# Patient Record
Sex: FEMALE | Race: WHITE | NOT HISPANIC OR LATINO | Employment: FULL TIME | URBAN - METROPOLITAN AREA
[De-identification: names, ages, dates, MRNs, and addresses within clinical notes are randomized per-mention and may not be internally consistent; named-entity substitution may affect disease eponyms.]

---

## 2023-12-05 ENCOUNTER — TELEPHONE (OUTPATIENT)
Dept: PRIMARY CARE | Facility: CLINIC | Age: 28
End: 2023-12-05

## 2023-12-18 DIAGNOSIS — E10.9 TYPE 1 DIABETES MELLITUS WITHOUT COMPLICATIONS (MULTI): ICD-10-CM

## 2023-12-24 RX ORDER — BLOOD-GLUCOSE SENSOR
EACH MISCELLANEOUS
Qty: 9 EACH | Refills: 3 | Status: SHIPPED | OUTPATIENT
Start: 2023-12-24

## 2024-07-26 DIAGNOSIS — E10.9 TYPE 1 DIABETES MELLITUS WITHOUT COMPLICATION (MULTI): Primary | ICD-10-CM

## 2024-07-26 RX ORDER — BLOOD-GLUCOSE TRANSMITTER
EACH MISCELLANEOUS
Qty: 1 EACH | Refills: 0 | Status: SHIPPED | OUTPATIENT
Start: 2024-07-26

## 2024-08-19 DIAGNOSIS — E10.9 TYPE 1 DIABETES MELLITUS WITHOUT COMPLICATION (MULTI): Primary | ICD-10-CM

## 2024-08-21 RX ORDER — INSULIN GLARGINE 100 [IU]/ML
13 INJECTION, SOLUTION SUBCUTANEOUS 2 TIMES DAILY
Qty: 30 ML | Refills: 0 | Status: SHIPPED | OUTPATIENT
Start: 2024-08-21 | End: 2024-11-19

## 2024-09-03 ENCOUNTER — OFFICE VISIT (OUTPATIENT)
Dept: ENDOCRINOLOGY | Facility: CLINIC | Age: 29
End: 2024-09-03
Payer: COMMERCIAL

## 2024-09-03 VITALS
HEART RATE: 86 BPM | DIASTOLIC BLOOD PRESSURE: 89 MMHG | BODY MASS INDEX: 31.83 KG/M2 | SYSTOLIC BLOOD PRESSURE: 126 MMHG | WEIGHT: 174 LBS

## 2024-09-03 DIAGNOSIS — E10.9 TYPE 1 DIABETES MELLITUS WITHOUT COMPLICATION (MULTI): Primary | ICD-10-CM

## 2024-09-03 LAB — POC HEMOGLOBIN A1C: 7.9 % (ref 4.2–6.5)

## 2024-09-03 PROCEDURE — 1036F TOBACCO NON-USER: CPT | Performed by: INTERNAL MEDICINE

## 2024-09-03 PROCEDURE — 3074F SYST BP LT 130 MM HG: CPT | Performed by: INTERNAL MEDICINE

## 2024-09-03 PROCEDURE — 3079F DIAST BP 80-89 MM HG: CPT | Performed by: INTERNAL MEDICINE

## 2024-09-03 PROCEDURE — 99214 OFFICE O/P EST MOD 30 MIN: CPT | Performed by: INTERNAL MEDICINE

## 2024-09-03 PROCEDURE — 83036 HEMOGLOBIN GLYCOSYLATED A1C: CPT | Performed by: INTERNAL MEDICINE

## 2024-09-03 RX ORDER — INSULIN GLARGINE 100 [IU]/ML
17 INJECTION, SOLUTION SUBCUTANEOUS 2 TIMES DAILY
Qty: 30 ML | Refills: 3 | Status: SHIPPED | OUTPATIENT
Start: 2024-09-03

## 2024-09-03 RX ORDER — INSULIN GLARGINE 100 [IU]/ML
17 INJECTION, SOLUTION SUBCUTANEOUS 2 TIMES DAILY
Qty: 30 ML | Refills: 3 | Status: SHIPPED | OUTPATIENT
Start: 2024-09-03 | End: 2024-09-03 | Stop reason: CLARIF

## 2024-09-03 RX ORDER — INSULIN ASPART INJECTION 100 [IU]/ML
5-10 INJECTION, SOLUTION SUBCUTANEOUS
Qty: 30 ML | Refills: 3 | Status: SHIPPED | OUTPATIENT
Start: 2024-09-03

## 2024-09-03 RX ORDER — BLOOD-GLUCOSE SENSOR
EACH MISCELLANEOUS
Qty: 9 EACH | Refills: 3 | Status: SHIPPED | OUTPATIENT
Start: 2024-09-03

## 2024-09-03 RX ORDER — INSULIN ASPART INJECTION 100 [IU]/ML
INJECTION, SOLUTION SUBCUTANEOUS
COMMUNITY
End: 2024-09-03 | Stop reason: SDUPTHER

## 2024-09-03 ASSESSMENT — ENCOUNTER SYMPTOMS
SORE THROAT: 0
ABDOMINAL PAIN: 0
CONSTIPATION: 0
NERVOUS/ANXIOUS: 0
SHORTNESS OF BREATH: 0
POLYDIPSIA: 0
APPETITE CHANGE: 0
DIARRHEA: 0
VOMITING: 0
HEADACHES: 0
COUGH: 0
NAUSEA: 0
FREQUENCY: 0
FEVER: 0
ARTHRALGIAS: 0

## 2024-09-03 NOTE — PROGRESS NOTES
History Of Present Illness  Dino Nicole is a 29 y.o. female     Duration of type 1 diabetes mellitus:  15 years  Complications:  none    Patient last seen 15 months ago    Fiasp 2 units per 15 grams carb before meals  Add 1 unit for every 50 over glucose 101 mg/dl.      Basaglar 16 units twice daily     OmniPod, stopped 2 years ago due to cost    DexCom G6  Patient is testing glucose 288 times daily  Upload failed    Last eye exam:  2017  Planned tomorrow    Past Medical History  She has a past medical history of Other conditions influencing health status.    Surgical History  She has a past surgical history that includes Other surgical history (05/25/2017).     Social History  She reports that she has never smoked. She has never used smokeless tobacco. No history on file for alcohol use and drug use.    Family History  No family history on file.    Medications  Current Outpatient Medications   Medication Instructions    Basaglar KwikPen U-100 Insulin 13 Units, subcutaneous, 2 times daily    blood-glucose sensor (Dexcom G6 Sensor) device USE AS DIRECTED FOR CONTINUOUS GLUCOSE MONITORING - CHANGE EVERY 10 DAYS    Dexcom G6 Transmitter device For continuous glucose monitoring.  Change every 90 days.    insulin aspart, with niacinamide, (Fiasp FlexTouch U-100 Insulin) 100 unit/mL (3 mL) injection subcutaneous, Take as directed per insulin instructions.       Allergies  Patient has no known allergies.    Review of Systems   Constitutional:  Negative for appetite change and fever.   HENT:  Negative for sore throat.         Denies dry mouth   Eyes:  Negative for visual disturbance.   Respiratory:  Negative for cough and shortness of breath.    Cardiovascular:  Negative for chest pain.   Gastrointestinal:  Negative for abdominal pain, constipation, diarrhea, nausea and vomiting.   Endocrine: Negative for polydipsia and polyuria.   Genitourinary:  Negative for frequency.   Musculoskeletal:  Negative for arthralgias.    Skin:  Negative for rash.   Neurological:  Negative for headaches.   Psychiatric/Behavioral:  The patient is not nervous/anxious.          Last Recorded Vitals  Blood pressure 126/89, pulse 86, weight 78.9 kg (174 lb).    Physical Exam  Constitutional:       General: She is not in acute distress.  HENT:      Head: Normocephalic.      Mouth/Throat:      Mouth: Mucous membranes are moist.   Eyes:      Extraocular Movements: Extraocular movements intact.   Neck:      Thyroid: No thyroid mass or thyromegaly.   Cardiovascular:      Pulses:           Radial pulses are 2+ on the right side and 2+ on the left side.   Musculoskeletal:      Right lower leg: No edema.      Left lower leg: No edema.   Lymphadenopathy:      Cervical: No cervical adenopathy.   Neurological:      Mental Status: She is alert.      Motor: No tremor.   Psychiatric:         Mood and Affect: Affect normal.          Relevant Results  Glucose (mg/dL)   Date Value   09/16/2023 169 (H)   11/17/2021 238 (H)   02/15/2021 133 (H)     POC HEMOGLOBIN A1c (%)   Date Value   09/03/2024 7.9 (A)     Hemoglobin A1C (%)   Date Value   11/17/2021 7.9 (A)   11/29/2019 7.4 (H)     Bicarbonate (mmol/L)   Date Value   09/16/2023 29   11/17/2021 28   02/15/2021 29     Urea Nitrogen (mg/dL)   Date Value   09/16/2023 8   11/17/2021 14   02/15/2021 6     Creatinine (mg/dL)   Date Value   09/16/2023 0.67   11/17/2021 0.79   02/15/2021 0.69         IMPRESSION  TYPE 1 DIABETES MELLITUS  Rapid A1c 7.9%  DexCom upload failed  Stated fasting hyperglycemia  Eye exam overdue    Patient is adhering to and benefitting from continuous glucose monitoring.       RECOMMENDATIONS  Fiasp (or covered alternative) 2 units per 15 grams carb before meals  Add 1 unit for every 50 over glucose 101 mg/dl.      Basaglar 17 units twice daily     Follow up 3 months  Draw labs, urine albumin before next appointment    Eye exam now and annually

## 2024-09-03 NOTE — PATIENT INSTRUCTIONS
A1c 7.9%    RECOMMENDATIONS  Fiasp (or covered alternative) 2 units per 15 grams carb before meals  Add 1 unit for every 50 over glucose 101 mg/dl.      Basaglar 17 units twice daily     Follow up 3 months  Draw labs, urine albumin before next appointment    Eye exam now and annually

## 2024-09-17 ENCOUNTER — APPOINTMENT (OUTPATIENT)
Dept: ENDOCRINOLOGY | Facility: CLINIC | Age: 29
End: 2024-09-17

## 2024-12-07 DIAGNOSIS — E10.9 TYPE 1 DIABETES MELLITUS WITHOUT COMPLICATION: ICD-10-CM

## 2024-12-09 RX ORDER — INSULIN GLARGINE 100 [IU]/ML
13 INJECTION, SOLUTION SUBCUTANEOUS 2 TIMES DAILY
Qty: 30 ML | Refills: 3 | Status: SHIPPED | OUTPATIENT
Start: 2024-12-09

## 2024-12-17 ENCOUNTER — APPOINTMENT (OUTPATIENT)
Dept: ENDOCRINOLOGY | Facility: CLINIC | Age: 29
End: 2024-12-17
Payer: COMMERCIAL

## 2024-12-17 VITALS
WEIGHT: 176 LBS | SYSTOLIC BLOOD PRESSURE: 116 MMHG | DIASTOLIC BLOOD PRESSURE: 82 MMHG | HEART RATE: 83 BPM | BODY MASS INDEX: 32.19 KG/M2

## 2024-12-17 DIAGNOSIS — E10.9 TYPE 1 DIABETES MELLITUS WITHOUT COMPLICATION: ICD-10-CM

## 2024-12-17 LAB — POC HEMOGLOBIN A1C: 7.4 % (ref 4.2–6.5)

## 2024-12-17 PROCEDURE — 3074F SYST BP LT 130 MM HG: CPT | Performed by: INTERNAL MEDICINE

## 2024-12-17 PROCEDURE — 99214 OFFICE O/P EST MOD 30 MIN: CPT | Performed by: INTERNAL MEDICINE

## 2024-12-17 PROCEDURE — 1036F TOBACCO NON-USER: CPT | Performed by: INTERNAL MEDICINE

## 2024-12-17 PROCEDURE — 83036 HEMOGLOBIN GLYCOSYLATED A1C: CPT | Performed by: INTERNAL MEDICINE

## 2024-12-17 PROCEDURE — 3079F DIAST BP 80-89 MM HG: CPT | Performed by: INTERNAL MEDICINE

## 2024-12-17 RX ORDER — INSULIN ASPART INJECTION 100 [IU]/ML
5-15 INJECTION, SOLUTION SUBCUTANEOUS
Qty: 45 ML | Refills: 3 | Status: SHIPPED | OUTPATIENT
Start: 2024-12-17

## 2024-12-17 ASSESSMENT — ENCOUNTER SYMPTOMS
SORE THROAT: 0
COUGH: 0
DIARRHEA: 0
HEADACHES: 0
CONSTIPATION: 0
POLYDIPSIA: 0
ARTHRALGIAS: 0
APPETITE CHANGE: 0
NAUSEA: 0
FREQUENCY: 0
VOMITING: 0
ABDOMINAL PAIN: 0
NERVOUS/ANXIOUS: 0
FEVER: 0
SHORTNESS OF BREATH: 0

## 2024-12-17 NOTE — LETTER
December 17, 2024     Junaid Pride DO  [address] [place] [id number]    Patient: Dino Nicole   YOB: 1995   Date of Visit: 12/17/2024       Dear Dr. Junaid Pride DO:    Thank you for referring Dino Nicole to me for evaluation. Below are my notes for this consultation.  If you have questions, please do not hesitate to call me. I look forward to following your patient along with you.       Sincerely,     Orlando Ba MD      CC: No Recipients  ______________________________________________________________________________________    History Of Present Illness  Dino Nicole is a 29 y.o. female     Duration of type 1 diabetes mellitus:  15 years  Complications:  none     Fiasp 2 units per 15 grams carb before meals  Add 1 unit for every 50 over glucose 101 mg/dl.      Basaglar 17 units twice daily      OmniPod, stopped 2 years ago due to cost     DexCom G7  Patient is testing glucose 1440 times daily  Records reviewed, on file     Last eye exam:  2017    Past Medical History  She has a past medical history of Other conditions influencing health status.    Surgical History  She has a past surgical history that includes Other surgical history (05/25/2017).     Social History  She reports that she has never smoked. She has never used smokeless tobacco. No history on file for alcohol use and drug use.    Family History  No family history on file.    Medications  Current Outpatient Medications   Medication Instructions   • Basaglar KwikPen U-100 Insulin 13 Units, subcutaneous, 2 times daily   • Dexcom G7 Sensor device For continuous glucose monitoring.  Change every 10 days.   • Fiasp FlexTouch U-100 Insulin 100 unit/mL (3 mL) injection 5-10 Units, subcutaneous, 3 times daily before meals, Take as directed per insulin instructions.       Allergies  Patient has no known allergies.    Review of Systems   Constitutional:  Negative for appetite change and fever.   HENT:  Negative for  sore throat.         Denies dry mouth   Eyes:  Negative for visual disturbance.   Respiratory:  Negative for cough and shortness of breath.    Cardiovascular:  Negative for chest pain.   Gastrointestinal:  Negative for abdominal pain, constipation, diarrhea, nausea and vomiting.   Endocrine: Negative for polydipsia and polyuria.   Genitourinary:  Negative for frequency.   Musculoskeletal:  Negative for arthralgias.   Skin:  Negative for rash.   Neurological:  Negative for headaches.   Psychiatric/Behavioral:  The patient is not nervous/anxious.          Last Recorded Vitals  Blood pressure 116/82, pulse 83, weight 79.8 kg (176 lb).    Physical Exam  Constitutional:       General: She is not in acute distress.  HENT:      Head: Normocephalic.      Mouth/Throat:      Mouth: Mucous membranes are moist.   Eyes:      Extraocular Movements: Extraocular movements intact.   Neck:      Thyroid: No thyroid mass or thyromegaly.   Cardiovascular:      Pulses:           Radial pulses are 2+ on the right side and 2+ on the left side.   Lymphadenopathy:      Cervical: No cervical adenopathy.   Neurological:      Mental Status: She is alert.      Motor: No tremor.   Psychiatric:         Mood and Affect: Affect normal.          Relevant Results  Glucose (mg/dL)   Date Value   09/16/2023 169 (H)   11/17/2021 238 (H)   02/15/2021 133 (H)     POC HEMOGLOBIN A1c (%)   Date Value   09/03/2024 7.9 (A)     Hemoglobin A1C (%)   Date Value   11/17/2021 7.9 (A)   11/29/2019 7.4 (H)     Bicarbonate (mmol/L)   Date Value   09/16/2023 29   11/17/2021 28   02/15/2021 29     Urea Nitrogen (mg/dL)   Date Value   09/16/2023 8   11/17/2021 14   02/15/2021 6     Creatinine (mg/dL)   Date Value   09/16/2023 0.67   11/17/2021 0.79   02/15/2021 0.69       IMPRESSION  TYPE 1 DIABETES MELLITUS  Rapid A1c 7.4%  A1c improved  Postprandial hyperglycemia at 1500 and 2300  Patient is adhering to and benefitting from continuous glucose monitoring.        RECOMMENDATIONS  Fiasp 1 unit per 5 grams carb before meals  Add 1 unit for every 50 over glucose 101 mg/dl.     Continue Basaglar 17 units twice daily    Follow up 3-4 months

## 2024-12-17 NOTE — PROGRESS NOTES
History Of Present Illness  Dino Nicole is a 29 y.o. female     Duration of type 1 diabetes mellitus:  15 years  Complications:  none     Fiasp 2 units per 15 grams carb before meals  Add 1 unit for every 50 over glucose 101 mg/dl.      Basaglar 17 units twice daily      OmniPod, stopped 2 years ago due to cost     DexCom G7  Patient is testing glucose 1440 times daily  Records reviewed, on file     Last eye exam:  2017    Past Medical History  She has a past medical history of Other conditions influencing health status.    Surgical History  She has a past surgical history that includes Other surgical history (05/25/2017).     Social History  She reports that she has never smoked. She has never used smokeless tobacco. No history on file for alcohol use and drug use.    Family History  No family history on file.    Medications  Current Outpatient Medications   Medication Instructions    Basaglar KwikPen U-100 Insulin 13 Units, subcutaneous, 2 times daily    Dexcom G7 Sensor device For continuous glucose monitoring.  Change every 10 days.    Fiasp FlexTouch U-100 Insulin 100 unit/mL (3 mL) injection 5-10 Units, subcutaneous, 3 times daily before meals, Take as directed per insulin instructions.       Allergies  Patient has no known allergies.    Review of Systems   Constitutional:  Negative for appetite change and fever.   HENT:  Negative for sore throat.         Denies dry mouth   Eyes:  Negative for visual disturbance.   Respiratory:  Negative for cough and shortness of breath.    Cardiovascular:  Negative for chest pain.   Gastrointestinal:  Negative for abdominal pain, constipation, diarrhea, nausea and vomiting.   Endocrine: Negative for polydipsia and polyuria.   Genitourinary:  Negative for frequency.   Musculoskeletal:  Negative for arthralgias.   Skin:  Negative for rash.   Neurological:  Negative for headaches.   Psychiatric/Behavioral:  The patient is not nervous/anxious.          Last Recorded  Vitals  Blood pressure 116/82, pulse 83, weight 79.8 kg (176 lb).    Physical Exam  Constitutional:       General: She is not in acute distress.  HENT:      Head: Normocephalic.      Mouth/Throat:      Mouth: Mucous membranes are moist.   Eyes:      Extraocular Movements: Extraocular movements intact.   Neck:      Thyroid: No thyroid mass or thyromegaly.   Cardiovascular:      Pulses:           Radial pulses are 2+ on the right side and 2+ on the left side.   Lymphadenopathy:      Cervical: No cervical adenopathy.   Neurological:      Mental Status: She is alert.      Motor: No tremor.   Psychiatric:         Mood and Affect: Affect normal.          Relevant Results  Glucose (mg/dL)   Date Value   09/16/2023 169 (H)   11/17/2021 238 (H)   02/15/2021 133 (H)     POC HEMOGLOBIN A1c (%)   Date Value   09/03/2024 7.9 (A)     Hemoglobin A1C (%)   Date Value   11/17/2021 7.9 (A)   11/29/2019 7.4 (H)     Bicarbonate (mmol/L)   Date Value   09/16/2023 29   11/17/2021 28   02/15/2021 29     Urea Nitrogen (mg/dL)   Date Value   09/16/2023 8   11/17/2021 14   02/15/2021 6     Creatinine (mg/dL)   Date Value   09/16/2023 0.67   11/17/2021 0.79   02/15/2021 0.69       IMPRESSION  TYPE 1 DIABETES MELLITUS  Rapid A1c 7.4%  A1c improved  Postprandial hyperglycemia at 1500 and 2300  Patient is adhering to and benefitting from continuous glucose monitoring.       RECOMMENDATIONS  Fiasp 1 unit per 5 grams carb before meals  Add 1 unit for every 50 over glucose 101 mg/dl.     Continue Basaglar 17 units twice daily    Follow up 3-4 months

## 2024-12-17 NOTE — PATIENT INSTRUCTIONS
A1c 7.4%    RECOMMENDATIONS  Fiasp 1 unit per 5 grams carb before meals  Add 1 unit for every 50 over glucose 101 mg/dl.     Continue Basaglar 17 units twice daily    Follow up 3-4 months

## 2024-12-18 DIAGNOSIS — E10.9 TYPE 1 DIABETES MELLITUS WITHOUT COMPLICATION: ICD-10-CM

## 2024-12-18 RX ORDER — INSULIN LISPRO 100 [IU]/ML
5-15 INJECTION, SOLUTION INTRAVENOUS; SUBCUTANEOUS
Qty: 45 ML | Refills: 3 | Status: SHIPPED | OUTPATIENT
Start: 2024-12-18 | End: 2025-12-18

## 2025-04-22 ENCOUNTER — APPOINTMENT (OUTPATIENT)
Dept: ENDOCRINOLOGY | Facility: CLINIC | Age: 30
End: 2025-04-22
Payer: COMMERCIAL

## 2025-04-22 VITALS
WEIGHT: 178 LBS | SYSTOLIC BLOOD PRESSURE: 131 MMHG | HEART RATE: 87 BPM | DIASTOLIC BLOOD PRESSURE: 91 MMHG | HEIGHT: 62 IN | BODY MASS INDEX: 32.76 KG/M2

## 2025-04-22 DIAGNOSIS — E10.9 TYPE 1 DIABETES MELLITUS WITHOUT COMPLICATION: ICD-10-CM

## 2025-04-22 LAB — POC HEMOGLOBIN A1C: 7.3 % (ref 4.2–6.5)

## 2025-04-22 PROCEDURE — 1036F TOBACCO NON-USER: CPT | Performed by: INTERNAL MEDICINE

## 2025-04-22 PROCEDURE — 83036 HEMOGLOBIN GLYCOSYLATED A1C: CPT | Performed by: INTERNAL MEDICINE

## 2025-04-22 PROCEDURE — 3008F BODY MASS INDEX DOCD: CPT | Performed by: INTERNAL MEDICINE

## 2025-04-22 PROCEDURE — 99214 OFFICE O/P EST MOD 30 MIN: CPT | Performed by: INTERNAL MEDICINE

## 2025-04-22 PROCEDURE — 3051F HG A1C>EQUAL 7.0%<8.0%: CPT | Performed by: INTERNAL MEDICINE

## 2025-04-22 PROCEDURE — 3075F SYST BP GE 130 - 139MM HG: CPT | Performed by: INTERNAL MEDICINE

## 2025-04-22 PROCEDURE — 3080F DIAST BP >= 90 MM HG: CPT | Performed by: INTERNAL MEDICINE

## 2025-04-22 ASSESSMENT — ENCOUNTER SYMPTOMS
NAUSEA: 0
SORE THROAT: 0
CONSTIPATION: 0
ARTHRALGIAS: 0
HEADACHES: 0
APPETITE CHANGE: 0
DIARRHEA: 0
VOMITING: 0
FEVER: 0
POLYDIPSIA: 0
SHORTNESS OF BREATH: 0
COUGH: 0
ABDOMINAL PAIN: 0
NERVOUS/ANXIOUS: 0
FREQUENCY: 0

## 2025-04-22 NOTE — LETTER
Dino LINO   1995      To Whom It May Concern:    Dino Lino is a patient under my care for diabetes mellitus.      It is medically necessary that she carry insulin, insulin needles, medications, glucose testing machine with lancets, and a small amount of sugar-containing food at all times.     Thank you for your consideration.       Sincerely,        Orlando Ba MD

## 2025-04-22 NOTE — PATIENT INSTRUCTIONS
A1c 7.3%    RECOMMENDATIONS  Continue current program    Draw labs, urine albumin  Labs performed at  locations are now processed by [email or web address] and results may or may not appear on [facility]  Please do not assume I have seen them until they appear on [employer].    You can request your results directly from [employer] at [email or web address]  Call/message if you have not received notification from me in 7 days.    Follow up 6 months  Review DexCom at all appointments.

## 2025-04-22 NOTE — LETTER
April 22, 2025     Junaid Pride DO  [address] [place] [id number]    Patient: Dino Nicole   YOB: 1995   Date of Visit: 4/22/2025       Dear Dr. Junaid Pride DO:    Thank you for referring Dino iNcole to me for evaluation. Below are my notes for this consultation.  If you have questions, please do not hesitate to call me. I look forward to following your patient along with you.       Sincerely,     Orlando Ba MD      CC: No Recipients  ______________________________________________________________________________________    History Of Present Illness  Dino Nicole is a 29 y.o. female     Duration of type 1 diabetes mellitus:  15 years  Complications:  none     Humalog 1 unit per 5 grams carb before meals  Add 1 unit for every 50 over glucose 101 mg/dl.      Basaglar 17 units twice daily      OmniPod, stopped 2 years ago due to cost     DexCom G7  Patient is testing glucose 1440 times daily  Records reviewed, on file     Last eye exam:  1/15/25    Past Medical History  She has a past medical history of Other conditions influencing health status.    Surgical History  She has a past surgical history that includes Other surgical history (05/25/2017).     Social History  She reports that she has never smoked. She has never used smokeless tobacco. No history on file for alcohol use and drug use.    Family History  Family History[1]    Medications  Current Outpatient Medications   Medication Instructions   • Basaglar KwikPen U-100 Insulin 13 Units, subcutaneous, 2 times daily   • Dexcom G7 Sensor device For continuous glucose monitoring.  Change every 10 days.   • insulin lispro (HUMALOG KWIKPEN INSULIN) 5-15 Units, subcutaneous, 3 times daily before meals, Take as directed per insulin instructions.       Allergies  Patient has no known allergies.    Review of Systems   Constitutional:  Negative for appetite change and fever.   HENT:  Negative for sore throat.         Denies  "dry mouth   Eyes:  Negative for visual disturbance.   Respiratory:  Negative for cough and shortness of breath.    Cardiovascular:  Negative for chest pain.   Gastrointestinal:  Negative for abdominal pain, constipation, diarrhea, nausea and vomiting.   Endocrine: Negative for polydipsia and polyuria.   Genitourinary:  Negative for frequency.   Musculoskeletal:  Negative for arthralgias.   Skin:  Negative for rash.   Neurological:  Negative for headaches.   Psychiatric/Behavioral:  The patient is not nervous/anxious.          Last Recorded Vitals  Blood pressure (!) 131/91, pulse 87, height 1.575 m (5' 2\"), weight 80.7 kg (178 lb).    Physical Exam  Constitutional:       General: She is not in acute distress.  HENT:      Head: Normocephalic.      Mouth/Throat:      Mouth: Mucous membranes are moist.   Eyes:      Extraocular Movements: Extraocular movements intact.   Neck:      Thyroid: No thyroid mass or thyromegaly.   Cardiovascular:      Pulses:           Radial pulses are 2+ on the right side and 2+ on the left side.   Musculoskeletal:      Right lower leg: No edema.      Left lower leg: No edema.   Lymphadenopathy:      Cervical: No cervical adenopathy.   Neurological:      Mental Status: She is alert.      Motor: No tremor.   Psychiatric:         Mood and Affect: Affect normal.          Relevant Results  Glucose (mg/dL)   Date Value   09/16/2023 169 (H)   11/17/2021 238 (H)   02/15/2021 133 (H)     POC HEMOGLOBIN A1c (%)   Date Value   04/22/2025 7.3 (A)   12/17/2024 7.4 (A)   09/03/2024 7.9 (A)     Bicarbonate (mmol/L)   Date Value   09/16/2023 29   11/17/2021 28   02/15/2021 29     Urea Nitrogen (mg/dL)   Date Value   09/16/2023 8   11/17/2021 14   02/15/2021 6     Creatinine (mg/dL)   Date Value   09/16/2023 0.67   11/17/2021 0.79   02/15/2021 0.69     Lab Results   Component Value Date    CHOL 219 (H) 11/17/2021    CHOL 213 (H) 11/29/2019     Lab Results   Component Value Date    .4 11/17/2021    HDL " 110 11/29/2019     Lab Results   Component Value Date    LDLCALC 90 11/29/2019     Lab Results   Component Value Date    TRIG 87 11/17/2021    TRIG 64 11/29/2019     Lab Results   Component Value Date    TSH 2.64 11/17/2021     CGM INTERPRETATION:  Patient is adhering to and benefitting from continuous glucose monitoring.   Model: DexCom G7  Period reviewed:  14 days  Testing glucose 1440 times daily    Average blood sugar 180 mg/dL, glucose management indicator 7.6%    Glucose less than 70 mg/dL equals 0% of time worn  Glucose ranging between 70 to 180 mg/dL represented by 52% of time worn  Glucose ranging greater than 180 mg/dL represented by 48% of time worn    >72 hours of data reviewed in order to inform diabetes treatment plan decision making, patient currently at risk for recurrent hypoglycemia safety concerns        IMPRESSION  TYPE 1 DIABETES MELLITUS  Rapid A1c 7.3%  A1c improving  Post-dinner hyperglycemia, but not enough to justify an increased carb ratio      RECOMMENDATIONS  Continue current program    Draw labs, urine albumin  Labs performed at  locations are now processed by [facility] and results may or may not appear on [facility]  Please do not assume I have seen them until they appear on [employer].    You can request your results directly from [employer] at [employer]  Call/message if you have not received notification from me in 7 days.    Follow up 6 months  Review DexCom at all appointments.              [1]  No family history on file.       [1]  No family history on file.

## 2025-04-22 NOTE — PROGRESS NOTES
History Of Present Illness  Dino Nicole is a 29 y.o. female     Duration of type 1 diabetes mellitus:  15 years  Complications:  none     Humalog 1 unit per 5 grams carb before meals  Add 1 unit for every 50 over glucose 101 mg/dl.      Basaglar 17 units twice daily      OmniPod, stopped 2 years ago due to cost     DexCom G7  Patient is testing glucose 1440 times daily  Records reviewed, on file     Last eye exam:  1/15/25    Past Medical History  She has a past medical history of Other conditions influencing health status.    Surgical History  She has a past surgical history that includes Other surgical history (05/25/2017).     Social History  She reports that she has never smoked. She has never used smokeless tobacco. No history on file for alcohol use and drug use.    Family History  Family History[1]    Medications  Current Outpatient Medications   Medication Instructions    Basaglar KwikPen U-100 Insulin 13 Units, subcutaneous, 2 times daily    Dexcom G7 Sensor device For continuous glucose monitoring.  Change every 10 days.    insulin lispro (HUMALOG KWIKPEN INSULIN) 5-15 Units, subcutaneous, 3 times daily before meals, Take as directed per insulin instructions.       Allergies  Patient has no known allergies.    Review of Systems   Constitutional:  Negative for appetite change and fever.   HENT:  Negative for sore throat.         Denies dry mouth   Eyes:  Negative for visual disturbance.   Respiratory:  Negative for cough and shortness of breath.    Cardiovascular:  Negative for chest pain.   Gastrointestinal:  Negative for abdominal pain, constipation, diarrhea, nausea and vomiting.   Endocrine: Negative for polydipsia and polyuria.   Genitourinary:  Negative for frequency.   Musculoskeletal:  Negative for arthralgias.   Skin:  Negative for rash.   Neurological:  Negative for headaches.   Psychiatric/Behavioral:  The patient is not nervous/anxious.          Last Recorded Vitals  Blood pressure (!)  "131/91, pulse 87, height 1.575 m (5' 2\"), weight 80.7 kg (178 lb).    Physical Exam  Constitutional:       General: She is not in acute distress.  HENT:      Head: Normocephalic.      Mouth/Throat:      Mouth: Mucous membranes are moist.   Eyes:      Extraocular Movements: Extraocular movements intact.   Neck:      Thyroid: No thyroid mass or thyromegaly.   Cardiovascular:      Pulses:           Radial pulses are 2+ on the right side and 2+ on the left side.   Musculoskeletal:      Right lower leg: No edema.      Left lower leg: No edema.   Lymphadenopathy:      Cervical: No cervical adenopathy.   Neurological:      Mental Status: She is alert.      Motor: No tremor.   Psychiatric:         Mood and Affect: Affect normal.          Relevant Results  Glucose (mg/dL)   Date Value   09/16/2023 169 (H)   11/17/2021 238 (H)   02/15/2021 133 (H)     POC HEMOGLOBIN A1c (%)   Date Value   04/22/2025 7.3 (A)   12/17/2024 7.4 (A)   09/03/2024 7.9 (A)     Bicarbonate (mmol/L)   Date Value   09/16/2023 29   11/17/2021 28   02/15/2021 29     Urea Nitrogen (mg/dL)   Date Value   09/16/2023 8   11/17/2021 14   02/15/2021 6     Creatinine (mg/dL)   Date Value   09/16/2023 0.67   11/17/2021 0.79   02/15/2021 0.69     Lab Results   Component Value Date    CHOL 219 (H) 11/17/2021    CHOL 213 (H) 11/29/2019     Lab Results   Component Value Date    .4 11/17/2021     11/29/2019     Lab Results   Component Value Date    LDLCALC 90 11/29/2019     Lab Results   Component Value Date    TRIG 87 11/17/2021    TRIG 64 11/29/2019     Lab Results   Component Value Date    TSH 2.64 11/17/2021     CGM INTERPRETATION:  Patient is adhering to and benefitting from continuous glucose monitoring.   Model: DexCom G7  Period reviewed:  14 days  Testing glucose 1440 times daily    Average blood sugar 180 mg/dL, glucose management indicator 7.6%    Glucose less than 70 mg/dL equals 0% of time worn  Glucose ranging between 70 to 180 mg/dL " represented by 52% of time worn  Glucose ranging greater than 180 mg/dL represented by 48% of time worn    >72 hours of data reviewed in order to inform diabetes treatment plan decision making, patient currently at risk for recurrent hypoglycemia safety concerns        IMPRESSION  TYPE 1 DIABETES MELLITUS  Rapid A1c 7.3%  A1c improving  Post-dinner hyperglycemia, but not enough to justify an increased carb ratio      RECOMMENDATIONS  Continue current program    Draw labs, urine albumin  Labs performed at  locations are now processed by [facility] and results may or may not appear on [facility]  Please do not assume I have seen them until they appear on [employer].    You can request your results directly from [employer] at [facility]  Call/message if you have not received notification from me in 7 days.    Follow up 6 months  Review DexCom at all appointments.           [1] No family history on file.

## 2025-10-22 ENCOUNTER — APPOINTMENT (OUTPATIENT)
Dept: ENDOCRINOLOGY | Facility: CLINIC | Age: 30
End: 2025-10-22
Payer: COMMERCIAL